# Patient Record
(demographics unavailable — no encounter records)

---

## 2025-07-18 NOTE — HISTORY OF PRESENT ILLNESS
[FreeTextEntry1] :   HPI: The patient is a 25 year-year-old F presenting for cosmetic evaluation of the nose. They report long-standing dissatisfaction with the appearance of their nose, specifically noting concerns such as  bulbous tip and asymmetry (L deviation) .   There is no history of nasal trauma or prior nasal surgery.   She endorses nasal obstruction, chronic sinus issues, and difficulty breathing through the nose.   She is undergoing a BBR in August.   PSH: Tonsillectomy 2003, wisdom teeth removal 2015, orthognathic surgery (BSSO?) 2015 PMH: anxiety, depression, OCD, PCOS, IBS, chronic migraine, asthma  Meds: Wellbutrin, spironolactone, metformin, Ajovy (migraine), fluvoxamine, PRN: midodrine, Rizatriptan  She feels that her anxiety and depression have been well controlled. She has been stable on her meds and finds talk therapy to be helpful.     General: Well-appearing, in no acute distress. No craniofacial abnormalities. Face: Facial symmetry preserved. Proportions within normal limits. No gross midface or mandibular deficiency. Chin projection appropriate for facial balance.  Dorsal profile: straight   Nasal tip: bulbous, over projected   Nasal length and projection: over projected   Alar base: normal   Columella: normal   Skin thickness: thick  External nasal deviation: left  Turbinates: Normal   Mucosa: Healthy, no erythema or lesions  No polyps or masses visualized  Functional Testing:  Crosby maneuver:  + b/l   Nasal valve collapse: + b/l external   Assessment: The patient is a healthy 25 year-old F presenting for evaluation of nasal aesthetics and function. Primary concerns include poorly defined tip and left garcia deviation. She also endorses symptoms of airway obstruction with a positive shira.   **Plan** We discussed the goals of surgery, including improvement in nasal contour and alignment with the patient's facial features. Planned procedures may include:  Tip refinement (suture and/or cartilage reshaping techniques)  Septoplasty (if indicated)  Osteotomies for bony narrowing or straightening   grafts or other structural grafting to support airway or aesthetics  She is undergoing a BBR in August and will schedule after that in Nov - Dec.    Preoperative Considerations:  Standard preoperative photographs  Medical clearance if indicated  Avoid NSAIDs, supplements, or other anticoagulants for 2 weeks prior  Review perioperative medications, including antibiotics and analgesics  Discuss realistic expectations and potential need for revision surgery  Risks reviewed include:  Bleeding, infection, scarring  Poor wound healing or unfavorable cosmetic outcome  Prolonged swelling or bruising  Nasal obstruction, septal perforation  Asymmetry or contour irregularity  Skin numbness or sensitivity  Need for revision or secondary procedures  Risks of general anesthesia  The patient verbalized understanding of risks, benefits, and alternatives, and consents to proceed.